# Patient Record
Sex: MALE | Race: WHITE
[De-identification: names, ages, dates, MRNs, and addresses within clinical notes are randomized per-mention and may not be internally consistent; named-entity substitution may affect disease eponyms.]

---

## 2017-12-30 ENCOUNTER — HOSPITAL ENCOUNTER (OUTPATIENT)
Dept: HOSPITAL 80 - FIMAGING | Age: 77
End: 2017-12-30
Attending: GENERAL ACUTE CARE HOSPITAL
Payer: COMMERCIAL

## 2017-12-30 DIAGNOSIS — M99.73: ICD-10-CM

## 2017-12-30 DIAGNOSIS — M48.061: Primary | ICD-10-CM

## 2018-07-26 ENCOUNTER — HOSPITAL ENCOUNTER (OUTPATIENT)
Dept: HOSPITAL 80 - F3N | Age: 78
Setting detail: OBSERVATION
LOS: 1 days | Discharge: HOME | End: 2018-07-27
Attending: NEUROLOGICAL SURGERY | Admitting: NEUROLOGICAL SURGERY
Payer: COMMERCIAL

## 2018-07-26 DIAGNOSIS — M48.062: Primary | ICD-10-CM

## 2018-07-26 PROCEDURE — G0378 HOSPITAL OBSERVATION PER HR: HCPCS

## 2018-07-26 PROCEDURE — 76001: CPT

## 2018-07-26 PROCEDURE — 00NY0ZZ RELEASE LUMBAR SPINAL CORD, OPEN APPROACH: ICD-10-PCS | Performed by: NEUROLOGICAL SURGERY

## 2018-07-26 PROCEDURE — 63047 LAM FACETEC & FORAMOT LUMBAR: CPT

## 2018-07-26 PROCEDURE — 0SB00ZZ EXCISION OF LUMBAR VERTEBRAL JOINT, OPEN APPROACH: ICD-10-PCS | Performed by: NEUROLOGICAL SURGERY

## 2018-07-26 RX ADMIN — Medication PRN MCG: at 10:14

## 2018-07-26 RX ADMIN — Medication PRN MCG: at 09:57

## 2018-07-26 RX ADMIN — FAMOTIDINE SCH MG: 20 TABLET, FILM COATED ORAL at 21:34

## 2018-07-26 RX ADMIN — DOCUSATE SODIUM AND SENNOSIDES SCH TAB: 50; 8.6 TABLET ORAL at 21:34

## 2018-07-26 RX ADMIN — ACETAMINOPHEN SCH MG: 500 TABLET ORAL at 21:34

## 2018-07-26 RX ADMIN — ACETAMINOPHEN SCH MG: 500 TABLET ORAL at 14:54

## 2018-07-26 RX ADMIN — Medication PRN MCG: at 10:16

## 2018-07-26 NOTE — POSTOPPROG
Post Op Note


Date of Operation: 07/26/18


Surgeon: Elise Puri


Assistant: AMINA Puri PA-C


Anesthesiologist: Taisha


Anesthesia: GET(General Endotracheal)


Pre-op Diagnosis: lumbar stenosis


Post-op Diagnosis: same


Indication: pain, weakness


Procedure: L3-5 laminectomy and discectomy


Findings: nerve compression


Inf/Abcess present in the surg proc area at time of surgery?: No


Depth: Organ Space


EBL: 


Complications: 





none


Drains: Rahul Borges


Specimen(s): 





none





PA Addendum





- Addendum


.: 





S: Pt awake in PACU, c/o phlegm in throat and mild back pain





O:


AAOx3


NAD


VSS


MAEx4


Motor 5/5 BUE & BLE


+LT


Incision dressed cdi


JPx1





A: 78 yo M s/p L3-5 laminectomy and discectomy





P:


Pain management


PT/OT


Cont home insulin, add low dose sliding scale


Spine precautions


TEDs, SCDs, lovenox POD#1


Admit overnight for obs


D/w Dr Prince


Call NS with any issues

## 2018-07-26 NOTE — GOP
[f 
rep st]



                                                                OPERATIVE REPORT





DATE OF OPERATION:  07/26/2018



SURGEON:  Brennen Prince MD



ASSISTANT:  Elise Puri, NAV.



ANESTHESIA:  General.



PREOPERATIVE DIAGNOSIS:  

1.  L3-L4 and L4-L5 severe spinal stenosis with disk herniation/bulge L4-L5.

2.  Lower extremity claudication with low back pain.

3.  Treatment refractory to nonoperative intervention.



POSTOPERATIVE DIAGNOSIS:  

1.  L3-L4 and L4-L5 severe spinal stenosis with disk herniation/bulge L4-L5.

2.  Lower extremity claudication with low back pain.

3.  Treatment refractory to nonoperative intervention.



PROCEDURE PERFORMED:  

1.  Decompressive laminectomy with bilateral medial facetectomies and 
foraminotomies at L3-L4 and L4-L5.

2.  Left-sided L4-L5 diskectomy.

3.  Use of intraoperative fluoroscopy, less than 1 hour physician time.

4.  Use of neuromonitoring.

5.  Use of operative microscope.



FINDINGS:  per imaging



SPECIMENS:  None.



ESTIMATED BLOOD LOSS:  100 mL.



INDICATIONS:  The patient is a  77-year-old gentleman who has been suffering 
from lower extremity claudication and radiculopathy. He also has some low back 
pain. He had evidence of severe spinal stenosis at L3-L4 and L4-L5. After 
discussion of the risks, benefits, and alternatives and after failing 
nonoperative intervention, we decided to proceed forth with surgery as 
described above.



DESCRIPTION OF PROCEDURE:  The patient was brought to the operating theater and 
underwent general endotracheal anesthesia without complications. He had 
Venodynes, ALAN hose, and the appropriate lines placed by Anesthesia. He was 
flipped prone onto the  Tobin frame and all bony prominences inspected and 
padded. The lower lumbar region was prepped and draped in the usual sterile 
surgical fashion. A time-out was completed per protocol and the patient 
received antibiotics within 1 hour of incision. 



Using lateral fluoroscopy and a spinal needle, we picked our entry point to the 
L3 through L5 levels. This was marked in the midline. The incision was 
infiltrated with Marcaine with epinephrine. The incision was taken down with a 
scalpel blade, and then using monopolar, taken down the midline through the 
lumbodorsal fascia and subperiosteal dissection carried out to the medial facet 
joints of L3-L4 and L4-L5. Deep retractors were placed to maintain our 
exposure. We confirmed our level using lateral fluoroscopy. Using a combination 
of the bur tip on the drill bit, Kerrison punches, and a Leksell rongeur, we 
completed decompressive laminectomy with bilateral medial facetectomies, L3-L4 
and L4-L5. We then retracted the thecal sac medially on the left side at L4-L5 
disk and completed partial diskectomy with micro pituitaries. Once we felt that 
everything was well decompressed, we reached out into the foramina and ensured 
that they were all well decompressed to manual palpation. 



We obtained hemostasis with bipolar and the wound irrigated copiously with 
bacitracin irrigation. We closed the wound in multiple layers using Vicryl 
sutures for the deep layers and Dermabond for the skin. The patient's wounds 
were dressed sterilely. He was flipped supine onto the transfer cart. He was 
awakened, extubated, and taken to the recovery room in stable condition. 



There were no complications and an improvement noted on neuromonitoring by the 
end of the procedure.



COMPLICATIONS:  None.





Job #:  [id number]

MTDD

## 2018-07-26 NOTE — PDANEPAE
ANE Past Medical History





- Cardiovascular History


Hx Hypertension: Yes


Hx Arrhythmias: No


Hx Chest Pain: No


Hx Coronary Artery / Peripheral Vascular Disease: No


Hx CHF / Valvular Disease: No


Hx Palpitations: No


Cardiovascular History Comment: hyperlipidemia





- Pulmonary History


Hx COPD: No


Hx Asthma/Reactive Airway Disease: No


Hx Recent Upper Respiratory Infection: No


Hx Oxygen in Use at Home: No


Hx Sleep Apnea: No


Sleep Apnea Screening Result - Last Documented: Positive


Pulmonary History Comment: mlia triggers





- Neurologic History


Hx Cerebrovascular Accident: No


Hx Seizures: No


Hx Dementia: No


Neurologic History Comment: spinal stenosis.  occ n/t in legs and feet





- Endocrine History


Hx Diabetes: Yes


Obesity: mild


Endocrine History Comment: type 2





- Renal History


Hx Renal Disorders: Yes


Renal History Comment: bph.  ed





- Liver History


Hx Hepatic Disorders: No





- Neurological & Psychiatric Hx


Hx Neurological and Psychiatric Disorders: No





- Cancer History


Hx Cancer: Yes


Cancer History Comment: colon ca.  skin ca





- Congenital Disorder History


Hx Congenital Disorders: No





- GI History


Hx Gastrointestinal Disorders: Yes


Gastrointestinal History Comment: hx of colon ca with resection.  occ reflux





- Other Health History


Other Health History: wears glasses





- Chronic Pain History


Chronic Pain: Yes (lower back)





- Surgical History


Prior Surgeries: colon resection 2003.  steroid injections 04/2016 and 11/2016





ANE Review of Systems


Review of Systems: 








- Exercise capacity


METS (RN): 4 METS





ANE Patient History





- Allergies


Allergies/Adverse Reactions: 








adhesive Allergy (Verified 07/26/18 06:10)


 


latex Allergy (Verified 07/16/18 10:48)


 causes rash








- Home Medications


Home medications: home medication list seen and reviewed


Home Medications: 








Insulin Detemir [Levemir] 20 unit SQ HS 10/20/13 [Last Taken 07/25/18 22:30]


glipiZIDE XL [Glucotrol XL 10 MG (*)] 10 mg PO DAILY@12 10/20/13 [Last Taken 07/ 25/18 09:00]


Acetaminophen [Tylenol  mg (*)] 500 mg PO HS 07/12/18 [Last Taken 07/25/18

]


Atorvastatin Calcium [Lipitor 10 mg (*)] 10 mg PO DAILY18 07/12/18 [Last Taken 

07/25/18 17:00]


Insulin Aspart [novoLOG] 10 - 15 unit SC TIDMEAL 07/12/18 [Last Taken 07/25/18 

17:00]


Lisinopril [Zestril 20 mg (*)] 20 mg PO DAILY 07/12/18 [Last Taken 07/25/18 09:

00]








- NPO status


NPO Status: no food or drink >8 hours


NPO Since - Liquids (Date): 07/25/18


NPO Since - Liquids (Time): 19:00


NPO Since - Solids (Date): 07/25/18


NPO Since - Solids (Time): 17:00





- Anes Hx


Anes Hx: no prior problems





- Smoking Hx


Smoking Status: Former smoker





- Family Anes Hx


Family Hx Anesthesia Complications: none





ANE Labs/Vital Signs





- Vital Signs


Blood Pressure: 118/69


Heart Rate: 49


Respiratory Rate: 16


O2 Sat (%): 94


Height: 180.4 cm


Weight: 95.9 kg





ANE Physical Exam





- Airway


Neck exam: FROM


Mallampati Score: Class 2


Mouth exam: normal dental/mouth exam





- Pulmonary


Pulmonary: no respiratory distress, no rales or rhonchi, clear to auscultation





- Cardiovascular


Cardiovascular: regular rate and rhythym, no murmur, rub, or gallop





- ASA Status


ASA Status: II





ANE Anesthesia Plan


Anesthesia Plan: general endotracheal anesthesia

## 2018-07-27 VITALS — SYSTOLIC BLOOD PRESSURE: 125 MMHG | DIASTOLIC BLOOD PRESSURE: 69 MMHG

## 2018-07-27 RX ADMIN — ACETAMINOPHEN SCH MG: 500 TABLET ORAL at 06:11

## 2018-07-27 RX ADMIN — FAMOTIDINE SCH MG: 20 TABLET, FILM COATED ORAL at 08:03

## 2018-07-27 RX ADMIN — DOCUSATE SODIUM AND SENNOSIDES SCH TAB: 50; 8.6 TABLET ORAL at 08:03

## 2018-07-27 NOTE — NEUSURGPN
Assessment/Plan: 





A: 76 yo M s/p L3-5 laminectomy and discectomy POD#1





P:


Pain management


PT/OT - patient is refusing therapies.


Cont home insulin, resume metformin 24 hours post op.


Spine precautions


TEDs, SCDs, lovenox POD#1


DUANE drain x 1 - remove today and change dressing


Admit overnight for obs - DC to home today


D/w Dr Prince


Call NS with any issues





Subjective: 


Pt resting in bedside chair, c/o low back discomfort, moving well overall.


Objective: 


AAOx3


NAD


VSS


MAEx4


Motor 5/5 BLE


Incision dressed cdi


JPx1 - serosanguineous dc in bulb


Urinary Catheter in Place: No





- Physician


Discussed Patient with : Niki





Neurosurgery Physical Exam





- Vitals, I&O, Labs





 I and O











 07/26/18 07/27/18 07/28/18





 05:59 05:59 05:59


 


Intake Total  2150 


 


Output Total  335 20


 


Balance  1815 -20


 


Weight  95.9 kg 


 


Intake:   


 


  Oral (ml)  1150 


 


  IV Intake (ml)  1000 


 


Output:   


 


  Estimated Blood Loss (ml)  75 


 


  DUANE Drain Output (ml)  260 20


 


    #1 Posterior Back Rahul  260 20





    Borges   


 


Other:   


 


  Intake Quantity  Yes 





  Sufficient   


 


  Number of Voids   


 


    Toilet  1 








 Vital Signs











Temp Pulse Resp BP Pulse Ox


 


 36.9 C   70   16   125/69 H  93 


 


 07/27/18 07:51  07/27/18 07:51  07/27/18 07:51  07/27/18 08:03  07/27/18 07:51














ICD10 Worksheet


Patient Problems: 


 Problems











Problem Status Onset


 


Lumbar stenosis Acute  














- ICD10 Problem Qualifiers


(1) Lumbar stenosis


Qualifiers: 


   Neurogenic claudication status: unspecified   Qualified Code(s): M48.061 - 

Spinal stenosis, lumbar region without neurogenic claudication

## 2018-07-27 NOTE — ASMTLACE
LACE

 

Length of stay for            Answers:  2 days                                

current admission                                                             

Acuity / Level of             Answers:  No                                    

Care: Did the patient                                                         

have an inpatient                                                             

admission?                                                                    

Comorbidities - select        Answers:  Any tumor (including                  

all that apply                          lymphoma or leukemia)                 

                                        Diabetes (uncontrolled or             

                                        controlled)                           

                                        Opioid dependence                     

                                        / Chronic pain                        

                                        Other                         Notes:  HTN; HLD

# of Emergency department     Answers:  0                                     

visits in the last 6                                                          

months                                                                        

Score: 10

 

Date Signed:  07/27/2018 05:10 PM

Electronically Signed By:SEUN Dill

## 2018-07-27 NOTE — ASMTCMCOM
CM Note

 

CM Note                       

Notes:

Pt declined therapy evals. Pt medically stable for d/c, no CM d/c needs identified. 

 

Date Signed:  07/27/2018 05:11 PM

Electronically Signed By:SEUN Dill

## 2018-08-07 NOTE — GDS
[f rep st]



                                                             DISCHARGE SUMMARY





ADMITTING DIAGNOSIS:  Lumbar stenosis and weakness.



DISCHARGE DIAGNOSIS:  Status post L3-L5 laminectomy and diskectomy.



CONSULTS:  Case Management.  Physical Therapy and Occupational Therapy were also consulted for this p
atient, but the patient refused therapy services.



DISPOSITION:  To home.



HOSPITAL COURSE:  The patient is a 77-year-old male patient who was seen in Dr. Prince's clinic as an
 outpatient.  He was found to have severe spinal stenosis at L3-4 and L4-5.  He had been suffering fr
om lower extremity claudication symptoms and radiculopathy.  He tried and failed conservative managem
ent and after careful consideration, elected to proceed with surgical intervention in the way of an L
3-L5 decompressive laminectomy and diskectomy.  The procedure was performed by Dr. Brennen Prince for 
which there were no known complications.  Please see his operative report for further details.  After
 the operation, the patient was in stable condition and was transferred the operating room to the PAC
U and from the PACU to the postsurgical floor.  While on the floor, patient received pain management 
and DVT prophylaxis.  His lumbar wound drain was removed.  He tolerated well and was ready for discha
rge on postoperative day #1, July 27, 2018.  Physical Therapy and Occupational Therapy were consulted
 for this patient, but the patient refused their services during his hospital admission.



DISCHARGE INSTRUCTIONS:  



DIET:  As tolerated.



ACTIVITY:  No bending, lifting, or twisting.  Patient to keep his incision clean and dry.



MEDICATIONS:  Please see the medication reconciliation.



FOLLOWUP:  The patient has been asked to follow up in the office in approximately 2-3 weeks.  We have
 asked the patient to call sooner with any additional questions or concerns at [phone].





Job #:  [id number]